# Patient Record
Sex: MALE | Race: WHITE | Employment: UNEMPLOYED | URBAN - METROPOLITAN AREA
[De-identification: names, ages, dates, MRNs, and addresses within clinical notes are randomized per-mention and may not be internally consistent; named-entity substitution may affect disease eponyms.]

---

## 2022-12-22 ENCOUNTER — APPOINTMENT (OUTPATIENT)
Dept: GENERAL RADIOLOGY | Age: 42
End: 2022-12-22
Payer: MEDICAID

## 2022-12-22 ENCOUNTER — HOSPITAL ENCOUNTER (EMERGENCY)
Age: 42
Discharge: HOME | End: 2022-12-22
Payer: MEDICAID

## 2022-12-22 VITALS
HEIGHT: 70 IN | BODY MASS INDEX: 27.63 KG/M2 | WEIGHT: 193 LBS | HEART RATE: 84 BPM | TEMPERATURE: 98.1 F | OXYGEN SATURATION: 99 % | SYSTOLIC BLOOD PRESSURE: 129 MMHG | DIASTOLIC BLOOD PRESSURE: 81 MMHG | RESPIRATION RATE: 16 BRPM

## 2022-12-22 DIAGNOSIS — I26.99 ACUTE PULMONARY EMBOLISM, UNSPECIFIED PULMONARY EMBOLISM TYPE, UNSPECIFIED WHETHER ACUTE COR PULMONALE PRESENT (HCC): ICD-10-CM

## 2022-12-22 DIAGNOSIS — J06.9 VIRAL URI WITH COUGH: Primary | ICD-10-CM

## 2022-12-22 LAB
EKG ATRIAL RATE: 66 BPM
EKG DIAGNOSIS: NORMAL
EKG P AXIS: 39 DEGREES
EKG P-R INTERVAL: 172 MS
EKG Q-T INTERVAL: 400 MS
EKG QRS DURATION: 84 MS
EKG QTC CALCULATION (BAZETT): 419 MS
EKG R AXIS: 52 DEGREES
EKG T AXIS: 55 DEGREES
EKG VENTRICULAR RATE: 66 BPM
RAPID INFLUENZA  B AGN: NEGATIVE
RAPID INFLUENZA A AGN: NEGATIVE

## 2022-12-22 PROCEDURE — 71046 X-RAY EXAM CHEST 2 VIEWS: CPT

## 2022-12-22 PROCEDURE — 93005 ELECTROCARDIOGRAM TRACING: CPT | Performed by: NURSE PRACTITIONER

## 2022-12-22 PROCEDURE — 99285 EMERGENCY DEPT VISIT HI MDM: CPT

## 2022-12-22 PROCEDURE — 87804 INFLUENZA ASSAY W/OPTIC: CPT

## 2022-12-22 ASSESSMENT — PAIN - FUNCTIONAL ASSESSMENT: PAIN_FUNCTIONAL_ASSESSMENT: NONE - DENIES PAIN

## 2022-12-22 NOTE — ED PROVIDER NOTES
EKG interpreted by me  Normal sinus rhythm with rate of 66 bpm, normal intervals. No ST elevation. Normal EKG.   No previous to compare     Patrick Riley MD  12/22/22 3284

## 2022-12-22 NOTE — ED TRIAGE NOTES
TO ED PER SQU[facility] FROM [facility] FOR COUGH,PATIENT STATES HE HAS HAD A PE AND WAS ADMITTED ON HEPARIN AND DISCHARGED ON ELIQUIS,PATIENT STATES HE WAS RECENTLY DIAGNOSED WITH RHINOVIRUS TODAYHE WAS EATING AND STARTING COUGHING AND WAS SENT HERE TO GET CHECKED,PATIENT DENIES AND SHORTNESS OF BREATH

## 2022-12-22 NOTE — DISCHARGE INSTRUCTIONS
Continue taking your anticoagulant as directed. Tylenol over-the-counter as directed for any pain or fever. Maintain hydration. Follow-up with a primary care provider within a week, call to schedule an appointment. Return to the emergency department with worsening symptoms. If you do not have a primary care provider, I have provided you the information for a provider accepting new patients.

## 2022-12-22 NOTE — ED PROVIDER NOTES
EMERGENCY DEPARTMENT ENCOUNTER      PCP: No primary care provider on file. CHIEF COMPLAINT    Chief Complaint   Patient presents with    Cough    Nasal Congestion         This patient was not evaluated by the attending physician. I have independently evaluated this patient . HPI    Garrick Singer is a 40 y.o. male who presents with presents the emergency department with complaints of cough and left-sided chest pain. The patient states he was recently diagnosed with a PE on Friday and spent 2 days in the hospital on a heparin drip. He states he is now taking Eliquis. He has been taking the medication as directed. He is currently staying at [facility] and states today he had a coughing episode that lasted approximately 5 minutes. He states that her nurse sent him here for further evaluation. He states he has no worsening symptoms. His pain is exactly the same as it was previously. He states the only thing that is changed is that he has some nasal congestion. He rates his pain as mild, aching, and constant. Nothing alleviates or exacerbates his symptoms. He denies fevers, chills, or other complaints. States he was recently diagnosed with rhinovirus. REVIEW OF SYSTEMS    Constitutional:  Denies fever, chills  HEENT:  See above  Cardiovascular: Pain that has been present for over 1 week, known PE. No new changes. Respiratory: Dry cough.   GI:  Denies abdominal pain, vomiting, or diarrhea   Neurologic:  Denies confusion, light headedness, dizziness, or syncope   Skin:  No rash    All other review of systems are negative  See HPI and nursing notes for additional information       PAST MEDICAL AND SURGICAL HISTORY    Past Medical History:   Diagnosis Date    Kidney stone     PE (pulmonary thromboembolism) ([facility] [address]      Past Surgical History:   Procedure Laterality Date    JOINT REPLACEMENT         CURRENT MEDICATIONS        ALLERGIES    Allergies   Allergen Reactions    Suboxone [Buprenorphine Hcl-Naloxone Hcl]     Tramadol        FAMILY AND SOCIAL HISTORY    History reviewed. No pertinent family history. Social History     Socioeconomic History    Marital status:      Spouse name: None    Number of children: None    Years of education: None    Highest education level: None   Tobacco Use    Smoking status: Never   Substance and Sexual Activity    Alcohol use: Not Currently    Drug use: Not Currently       PHYSICAL EXAM    VITAL SIGNS: /81   Pulse 84   Temp 98.1 °F (36.7 °C) (Oral)   Resp 16   Ht 5' 10\" (1.778 m)   Wt 193 lb (87.5 kg)   SpO2 99%   BMI 27.69 kg/m²   Constitutional:  Well developed, well nourished, no acute distress, non-toxic appearance   Eyes: Conjunctiva normal, sclera non-icteric  HEENT:     - Normocephalic, atraumatic   - PERRL, EOM intact. Conjunctiva normal    -  Frontal/Maxillary sinuses NONtender to percussion.   - External auditory canals  clear   - TMs clear without discharge, fluid, or bulging.   - Nasal passages with mildly erythematous and edematous turbinates. No massess.   - Oropharynx mildly erythematous without tonsillar hypertrophy or exudate. Neck/Lymphatics:    - supple, no JVD, no swollen nodes     Respiratory:     Nonlabored breathing - No retractions, no accessory muscle use   Clear to auscultation bilateral lung fields, no wheezes/rales/rhonchi. Cardiovascular:  Normal rate, normal rhythm   GI:  Soft, no abdominal tenderness, no guarding. Musculoskeletal:  No obvious deficits, no edema   Integument:  Skin pink, warm, dry, intact       RADIOLOGY/PROCEDURES    XR CHEST (2 VW)   Final Result   No evidence of pneumonia. Multiple right anterior rib fracture deformities, 1 of which involving the   right anterior 6th rib appears subacute. Nodular opacity projecting over the right anterior 3rd rib may be related to   rib fracture deformity although underlying lung nodule not excluded.    Recommend CT chest. ED COURSE & MEDICAL DECISION MAKING        45-year-old male with known PE presents the emergency department from [facility] for evaluation of cough. The patient states the nurse sent him here for further evaluation, however he does not have any new symptoms related to his PE. He states he does have some increased nasal congestion. EKG obtained and showed a sinus rhythm. Chest x-ray obtained and showed old rib fractures and possible lung nodule. CTA from 12/16/2022 was evaluated and did not show a pulmonary nodule. I discussed these findings with the patient and instructed him to follow-up outpatient with a primary care provider within the next 3 months. Influenza test is negative. He is afebrile and appears nontoxic. His chest pain is unchanged from his PE and I did not feel further cardiac work-up was necessary at this time. He likely has having increased nasal congestion from the rhinovirus he tested positive for. He was instructed to take Tylenol as directed for any pain, continue taking Eliquis as directed, follow-up with her primary care provider within a week and call tomorrow to schedule an appointment, and return with worsening symptoms. He verbalized understanding and agrees with plan of care. I discussed the likely viral etiology of patient's symptoms with Pt today and recommend symptomatic treatment with increase fluids, rest.   I provided symptomatic treatment. I recommend followup with primary care provider in 2-3 days for recheck. Patient agrees to return emergency department if symptoms worsen or any new symptoms develop. Vital signs and nursing notes reviewed during ED course. Differential Diagnoses:  Acute Bronchitis, Pneumonia, Airway Obstruction, Upper Respiratory Viral infection, Sinusitis, Pharyngitis, Wendy-Tonsillar Abscess, PE, ACS        Clinical  IMPRESSION    1.  Viral URI with cough    2. Acute pulmonary embolism, unspecified pulmonary embolism type, unspecified whether acute cor pulmonale present [facility])          Comment: Please note this report has been produced using speech recognition software and may contain errors related to that system including errors in grammar, punctuation, and spelling, as well as words and phrases that may be inappropriate. If there are any questions or concerns please feel free to contact the dictating provider for clarification.       Kimberly Mixon, IGLESIA - CNP  12/22/22 2199